# Patient Record
Sex: FEMALE | Race: WHITE
[De-identification: names, ages, dates, MRNs, and addresses within clinical notes are randomized per-mention and may not be internally consistent; named-entity substitution may affect disease eponyms.]

---

## 2021-04-14 ENCOUNTER — HOSPITAL ENCOUNTER (INPATIENT)
Dept: HOSPITAL 47 - EC | Age: 66
LOS: 2 days | Discharge: HOME | DRG: 287 | End: 2021-04-16
Attending: HOSPITALIST | Admitting: HOSPITALIST
Payer: MEDICARE

## 2021-04-14 DIAGNOSIS — R07.89: Primary | ICD-10-CM

## 2021-04-14 DIAGNOSIS — I10: ICD-10-CM

## 2021-04-14 DIAGNOSIS — Z88.2: ICD-10-CM

## 2021-04-14 DIAGNOSIS — Z90.710: ICD-10-CM

## 2021-04-14 DIAGNOSIS — Z88.1: ICD-10-CM

## 2021-04-14 DIAGNOSIS — Z98.890: ICD-10-CM

## 2021-04-14 DIAGNOSIS — Z98.42: ICD-10-CM

## 2021-04-14 DIAGNOSIS — Z90.49: ICD-10-CM

## 2021-04-14 DIAGNOSIS — Z79.82: ICD-10-CM

## 2021-04-14 DIAGNOSIS — Z86.73: ICD-10-CM

## 2021-04-14 DIAGNOSIS — Z88.5: ICD-10-CM

## 2021-04-14 DIAGNOSIS — I44.7: ICD-10-CM

## 2021-04-14 DIAGNOSIS — Z88.0: ICD-10-CM

## 2021-04-14 DIAGNOSIS — E78.5: ICD-10-CM

## 2021-04-14 DIAGNOSIS — J06.9: ICD-10-CM

## 2021-04-14 DIAGNOSIS — Z98.41: ICD-10-CM

## 2021-04-14 PROCEDURE — 93458 L HRT ARTERY/VENTRICLE ANGIO: CPT

## 2021-04-14 PROCEDURE — 93005 ELECTROCARDIOGRAM TRACING: CPT

## 2021-04-14 PROCEDURE — 85025 COMPLETE CBC W/AUTO DIFF WBC: CPT

## 2021-04-14 PROCEDURE — 80048 BASIC METABOLIC PNL TOTAL CA: CPT

## 2021-04-14 PROCEDURE — 99285 EMERGENCY DEPT VISIT HI MDM: CPT

## 2021-04-14 PROCEDURE — 36415 COLL VENOUS BLD VENIPUNCTURE: CPT

## 2021-04-14 PROCEDURE — 84484 ASSAY OF TROPONIN QUANT: CPT

## 2021-04-14 PROCEDURE — 94760 N-INVAS EAR/PLS OXIMETRY 1: CPT

## 2021-04-14 PROCEDURE — 80061 LIPID PANEL: CPT

## 2021-04-14 NOTE — ED
General Adult HPI





- General


Stated complaint: Chest Pain


Time Seen by Provider: 04/14/21 18:10


Source: patient, RN notes reviewed, old records reviewed





- History of Present Illness


Initial comments: 





This is a 65-year-old female that comes to us from [facility].  Patient 

was admitted as a 23 hour observation and patient had 2 negative troponins and a

left bundle branch block which was not seen her previous EKG per patient came in

for chest pain shortness of breath and saw cardiology and they did a stress test

and found that the patient had some areas of potential ischemia and so the 

patient was transferred and accepted to [facility] by Dr. Bear.  He is

exhibiting some very mild chest pain at this time.  Patient denies any recent 

fever chills or cough.





- Related Data


                                Home Medications











 Medication  Instructions  Recorded  Confirmed


 


Aspirin EC [Ecotrin Low Dose] 81 mg PO DAILY 04/14/21 04/14/21











                                    Allergies











Allergy/AdvReac Type Severity Reaction Status Date / Time


 


morphine Allergy  Rash/Hives Verified 04/14/21 20:23


 


ofloxacin [From Floxin] Allergy  Anaphylaxis Verified 04/14/21 20:23


 


Penicillins Allergy  Rash/Hives Verified 04/14/21 20:23


 


sulfamethoxazole Allergy  Rash/Hives Verified 04/14/21 20:23





[From Bactrim]     


 


trimethoprim [From Bactrim] Allergy  Rash/Hives Verified 04/14/21 20:23














Review of Systems


ROS Statement: 


Those systems with pertinent positive or pertinent negative responses have been 

documented in the HPI.





ROS Other: All systems not noted in ROS Statement are negative.





General Exam





- General Exam Comments


Initial Comments: 





GENERAL:


Patient is well-developed and well-nourished.  Patient is nontoxic and well-

hydrated and is in mild distress.





ENT:


Neck is soft and supple.  No significant lymphadenopathy is noted.  Oropharynx 

is clear.  Moist mucous membranes.  Neck has full range of motion without 

eliciting any pain.





EYES:


The sclera were anicteric and conjunctiva were pink and moist.  Extraocular 

movements were intact and pupils were equal round and reactive to light.  

Eyelids were unremarkable.





PULMONARY:


Unlabored respirations.  Good breath sounds bilaterally.  No audible rales 

rhonchi or wheezing was noted.





CARDIOVASCULAR:


There is a regular rate and rhythm without any murmurs gallops or rubs.  





ABDOMEN:


Soft and nontender with normal bowel sounds.  





SKIN:


Skin is clear with no lesions or rashes and otherwise unremarkable.





NEUROLOGIC:


Patient is alert and oriented x3.  Cranial nerves II through XII are grossly 

intact.  Motor and sensory are also intact.  Normal speech, volume and content. 

Symmetrical smile.  





MUSCULOSKELETAL:


Normal extremities with adequate strength and full range of motion.  No lower 

extremity swelling or edema.  No calf tenderness.





LYMPHATICS:


No significant lymphadenopathy is noted





PSYCHIATRIC:


Normal psychiatric evaluation.  





Course


                                   Vital Signs











  04/14/21 04/14/21





  18:21 20:43


 


Temperature 97.9 F 98.1 F


 


Pulse Rate 65 76


 


Respiratory 16 18





Rate  


 


Blood Pressure 138/80 125/78


 


O2 Sat by Pulse 97 98





Oximetry  














Medical Decision Making





- Medical Decision Making





I spoke with Dr. Moreno's nurse practitioner she agreed to accept the patient 

admitted the patient is Dr. Moreno and I wrote admitting orders I consult 

cardiology.  I continue the heparin drip.





EKG shows a normal sinus rhythm at 63 bpm AL interval is 164 QRS is 140 QT 

interval 484 QTC is 495 EKG shows a left bundle branch block





- Lab Data


                                   Lab Results











  04/14/21 Range/Units





  18:35 


 


Troponin I  <0.012  (0.000-0.034)  ng/mL














Disposition


Clinical Impression: 


 Unstable angina pectoris





Disposition: ADMITTED AS IP TO THIS [facility]


Time of Disposition: 20:12

## 2021-04-15 LAB
ANION GAP SERPL CALC-SCNC: 6 MMOL/L
BASOPHILS # BLD AUTO: 0 K/UL (ref 0–0.2)
BASOPHILS NFR BLD AUTO: 1 %
BUN SERPL-SCNC: 11 MG/DL (ref 7–17)
CALCIUM SPEC-MCNC: 8.8 MG/DL (ref 8.4–10.2)
CHLORIDE SERPL-SCNC: 108 MMOL/L (ref 98–107)
CHOLEST SERPL-MCNC: 163 MG/DL (ref 0–200)
CO2 SERPL-SCNC: 25 MMOL/L (ref 22–30)
EOSINOPHIL # BLD AUTO: 0.3 K/UL (ref 0–0.7)
EOSINOPHIL NFR BLD AUTO: 5 %
ERYTHROCYTE [DISTWIDTH] IN BLOOD BY AUTOMATED COUNT: 4.43 M/UL (ref 3.8–5.4)
ERYTHROCYTE [DISTWIDTH] IN BLOOD: 13.4 % (ref 11.5–15.5)
GLUCOSE SERPL-MCNC: 83 MG/DL (ref 74–99)
HCT VFR BLD AUTO: 39.9 % (ref 34–46)
HDLC SERPL-MCNC: 48 MG/DL (ref 40–60)
HGB BLD-MCNC: 13.7 GM/DL (ref 11.4–16)
LDLC SERPL CALC-MCNC: 93 MG/DL (ref 0–131)
LYMPHOCYTES # SPEC AUTO: 1.2 K/UL (ref 1–4.8)
LYMPHOCYTES NFR SPEC AUTO: 26 %
MCH RBC QN AUTO: 30.9 PG (ref 25–35)
MCHC RBC AUTO-ENTMCNC: 34.3 G/DL (ref 31–37)
MCV RBC AUTO: 90 FL (ref 80–100)
MONOCYTES # BLD AUTO: 0.3 K/UL (ref 0–1)
MONOCYTES NFR BLD AUTO: 6 %
NEUTROPHILS # BLD AUTO: 3 K/UL (ref 1.3–7.7)
NEUTROPHILS NFR BLD AUTO: 62 %
PLATELET # BLD AUTO: 187 K/UL (ref 150–450)
POTASSIUM SERPL-SCNC: 4.2 MMOL/L (ref 3.5–5.1)
SODIUM SERPL-SCNC: 139 MMOL/L (ref 137–145)
TRIGL SERPL-MCNC: 110 MG/DL (ref 0–149)
VLDLC SERPL CALC-MCNC: 22 MG/DL (ref 5–40)
WBC # BLD AUTO: 4.9 K/UL (ref 3.8–10.6)

## 2021-04-15 RX ADMIN — NITROGLYCERIN SCH: 20 OINTMENT TOPICAL at 06:21

## 2021-04-15 RX ADMIN — NITROGLYCERIN SCH INCH: 20 OINTMENT TOPICAL at 00:26

## 2021-04-15 NOTE — P.CRDCN
History of Present Illness


Consult date: 04/15/21


History of present illness: 





HISTORY OF PRESENT ILLNESS:  





This is a 65-year-old female with a past medical history significant for TIA. 

Patient does not follow with a cardiologist. We have been asked to see the 

patient in consultation for chest pain. Patient examined at the bedside.  

Patient was transferred from [facility] yesterday secondary to abnormal 

stress test and new left bundle branch block found on EKG.  The patient states 

she has been having shortness of breath for the last 3 weeks and chest pressure.

 She went to the urgent care and was diagnosed with an upper respiratory 

infection and was given 3 different kinds of medication.  The patient states she

did not have any relief with these medications and she called her primary care 

physician who told her to go to the emergency room.  At the time of examination,

the patient denies any chest pain or pressure.





Echocardiogram completed at [facility] revealed ejection fraction of 40-

45% with global hypokinesis, trace to mild tricuspid regurgitation, and trace to

mild mitral regurgitation





Patient underwent Lexiscan stress test at Hospital which revealed a medium-

sized, moderate intensity redundantly fixed defect involving the mid and apical 

anterior and anteroseptal territories with associated reduced wall motion and 

thickening.  This is concerning for a prior jose-infarct ischemia.  Ejection 

fraction noted to be 56%.  No stress transient ischemic dilation of the left 

ventricle.





EKG reveals sinus mechanism with left bundle branch block


Laboratory data: WBC 4.9.  Hemoglobin 13.7.  Platelet count 187.  Sodium 139.  

Potassium 4.2.  BUN 11.  Creatinine 0.63.  Troponin negative 3


Current home cardiac medications include aspirin 81 mg daily





REVIEW OF SYSTEMS: 


At the time of my exam:


CONSTITUTIONAL: Denies fever or chills.


HEENT: Denies blurred vision, vision changes, or eye pain. Denies hemoptysis 


CARDIOVASCULAR: Denies chest pain. Denies orthopnea. Denies PND. Denies 

palpitations


RESPIRATORY: Denies shortness of breath. 


GASTROINTESTINAL: Denies abdominal pain. Denies nausea or vomiting. 


HEMATOLOGIC: Denies bleeding disorders.


GENITOURINARY:  Denies any blood in urine.


SKIN: Denies pruitis. Denies rash.





PHYSICAL EXAM: 


VITAL SIGNS: Reviewed.


GENERAL: Well-developed in no acute distress. 


HEENT: Head is normocephalic. Pupils are equal, round. Sclerae anicteric. Mucous

membranes of the mouth are moist. Neck supple. No JVD or thyromegaly


LUNGS: Respirations even and unlabored. Lungs essentially clear to auscultation 

bilaterally.


HEART: Regular rate and rhythm.  S1 and S2 heard.


ABDOMEN: Soft. Nondistended. Nontender.


EXTREMITIES: Normal range of motion.  No clubbing or cyanosis.  Peripheral 

pulses intact.  No lower extremity edema


NEUROLOGIC: Awake and alert. Oriented x 3. 





ASSESSMENT: 


Chest pain and shortness of breath x 3 weeks


Abnormal Lexiscan stress test 


History of TIA





PLAN: 


Begin aspirin 81 mg daily and metoprolol succinate 12.5 mg daily


Begin Lipitor 40 mg daily. Obtain lipid panel. 


Patient to undergo cardiac catheterization tomorrow with Dr. Spencer


Further recommendations pending patient's course





Nurse practitioner note has been reviewed by physician. Signing provider agrees 

with the documented findings, assessment, and plan of care. 








Past Medical History


Past Medical History: Hypertension


Additional Past Medical History / Comment(s): TIA X2 last one was 3 years ago


History of Any Multi-Drug Resistant Organisms: None Reported


Past Surgical History: Appendectomy,  Section, Cholecystectomy, 

Hysterectomy


Additional Past Surgical History / Comment(s): cataract bilateral eyes


Past Anesthesia/Blood Transfusion Reactions: No Reported Reaction


Past Psychological History: No Psychological Hx Reported


Smoking Status: Never smoker


Past Alcohol Use History: None Reported


Past Drug Use History: None Reported





Medications and Allergies


                                Home Medications











 Medication  Instructions  Recorded  Confirmed  Type


 


Aspirin EC [Ecotrin Low Dose] 81 mg PO DAILY 21 History








                                    Allergies











Allergy/AdvReac Type Severity Reaction Status Date / Time


 


morphine Allergy  Rash/Hives Verified 21 20:23


 


ofloxacin [From Floxin] Allergy  Anaphylaxis Verified 21 20:23


 


Penicillins Allergy  Rash/Hives Verified 21 20:23


 


sulfamethoxazole Allergy  Rash/Hives Verified 21 20:23





[From Bactrim]     


 


trimethoprim [From Bactrim] Allergy  Rash/Hives Verified 21 20:23














Physical Exam


Vitals: 


                                   Vital Signs











  Temp Pulse Pulse Resp BP BP Pulse Ox


 


 04/15/21 07:23        94 L


 


 04/15/21 07:15  98.6 F   74    113/69  96


 


 04/15/21 02:42  97.7 F   60  14   118/77  99


 


 04/15/21 00:19    68    


 


 21 23:00    68  16   


 


 21 22:08  97.8 F   68  16   124/79  99


 


 21 20:43  98.1 F  76   18  125/78   98


 


 21 18:21  97.9 F  65   16  138/80   97








                                Intake and Output











 04/14/21 04/15/21 04/15/21





 22:59 06:59 14:59


 


Other:   


 


  # Voids  3 


 


  Weight 93.894 kg  














Results





                                 04/15/21 06:57





                                 04/15/21 06:57


                                 Cardiac Enzymes











  04/14/21 04/14/21 04/15/21 Range/Units





  18:35 21:17 01:06 


 


Troponin I  <0.012  <0.012  <0.012  (0.000-0.034)  ng/mL








                                     Lipids











  04/15/21 Range/Units





  07:01 


 


Triglycerides  110.0  (0.0-149.0)  mg/dL


 


Cholesterol  163  (0-200)  mg/dL


 


HDL Cholesterol  48.0  (40.0-60.0)  mg/dL


 


Cholesterol/HDL Ratio  3.40  








                                       CBC











  04/15/21 Range/Units





  06:57 


 


WBC  4.9  (3.8-10.6)  k/uL


 


RBC  4.43  (3.80-5.40)  m/uL


 


Hgb  13.7  (11.4-16.0)  gm/dL


 


Hct  39.9  (34.0-46.0)  %


 


Plt Count  187  (150-450)  k/uL








                          Comprehensive Metabolic Panel











  04/15/21 Range/Units





  06:57 


 


Sodium  139  (137-145)  mmol/L


 


Potassium  4.2  (3.5-5.1)  mmol/L


 


Chloride  108 H  ()  mmol/L


 


Carbon Dioxide  25  (22-30)  mmol/L


 


BUN  11  (7-17)  mg/dL


 


Creatinine  0.63  (0.52-1.04)  mg/dL


 


Glucose  83  (74-99)  mg/dL


 


Calcium  8.8  (8.4-10.2)  mg/dL








                               Current Medications











Generic Name Dose Route Start Last Admin





  Trade Name Freq  PRN Reason Stop Dose Admin


 


Alprazolam  0.25 mg  04/15/21 10:26 





  Alprazolam 0.25 Mg Tab  PO  





  Q6HR PRN  





  Mild Anxiety  


 


Alprazolam  0.5 mg  04/15/21 10:26 





  Alprazolam 0.5 Mg Tab  PO  





  Q6HR PRN  





  Moderate Anxiety  


 


Aspirin  81 mg  21 09:00 





  Aspirin 81 Mg  PO  





  DAILY JACQUELYN  


 


Aspirin  325 mg  21 07:00 





  Aspirin 325 Mg Tab  PO  21 07:01 





  ONCE ONE  


 


Atorvastatin Calcium  80 mg  21 07:00 





  Atorvastatin 80 Mg Tab  PO  21 07:01 





  ONCE ONE  


 


Atorvastatin Calcium  40 mg  04/15/21 21:00 





  Atorvastatin 40 Mg Tab  PO  





  HS JACQUELYN  


 


Sodium Chloride 1,000 ml/ IV  1,000 mls @ 93.894 mls/hr  04/15/21 23:00 





  Solution  IV  21 09:39 





  .[id number] ONE  





  1 ML/KG/HR  


 


Heparin Sodium (Porcine) 10,  1,001 mls @ 999 mls/hr  21 07:00 





  000 unit/ Sodium Chloride  IRRIGATION  21 23:00 





  ONCE PRN  





  INTRA-OP  


 


Heparin Sodium (Porcine) 2,500  250.5 mls @ 250 mls/hr  21 07:00 





  unit/ Sodium Chloride  IRRIGATION  21 23:00 





  ONCE PRN  





  INTRA-OP  


 


Nitroglycerin  0.4 mg  21 20:13 





  Nitroglycerin Sl Tabs 0.4 Mg Tab  SUBLINGUAL  





  Q5M PRN  





  Chest Pain  








                                Intake and Output











 04/14/21 04/15/21 04/15/21





 22:59 06:59 14:59


 


Other:   


 


  # Voids  3 


 


  Weight 93.894 kg  








                                        





                                 04/15/21 06:57 





                                 04/15/21 06:57

## 2021-04-15 NOTE — P.HPIM
History of Present Illness


65-year-old the female was seen at outside hospital with complaints of chest 

pressure-like sensation typical chest pain pressure-like sensation nonexertional

on end up going on for about 4 days along with diaphoresis worsens with exertion

and she does have shortness of breath because of shortness of breath patient 

can't even use bathroom.  Patient the had a stress test at a different facility 

which came back positive because of which the patient was transferred here 

patient had a new left bundle branch block on the EKG as well.  Patient chest 

pressure which was mild has been going on for about 3 weeks.  Echocardiac him 

that was done at [facility] showed EF of 4 and 40-45% with global 

hypokinesis.  Patient's stress test is positive involving the mid apical 

anterior and apical anteroseptal territories with inducible ischemia.








Review of Systems








REVIEW OF SYSTEMS: 


CONSTITUTIONAL: No fever, no malaise, no fatigue. 


HEENT: No recent visual problems or hearing problems. Denied any sore throat. 


CARDIOVASCULAR: No orthopnea, PND, no palpitations, no syncope. 


PULMONARY: no cough, no hemoptysis. 


GASTROINTESTINAL: No diarrhea, no nausea, no vomiting, no abdominal pain. 


NEUROLOGICAL: No headaches, no weakness, no numbness. 


HEMATOLOGICAL: Denies any bleeding or petechiae. 


GENITOURINARY: Denies any burning micturition, frequency, or urgency. 


MUSCULOSKELETAL/RHEUMATOLOGICAL: Denies any joint pain, swelling, or any muscle 

pain. 


ENDOCRINE: Denies any polyuria or polydipsia. 





The rest of the 14-point review of systems is negative.











Past Medical History


Past Medical History: Hypertension


Additional Past Medical History / Comment(s): TIA X2 last one was 3 years ago


History of Any Multi-Drug Resistant Organisms: None Reported


Past Surgical History: Appendectomy,  Section, Cholecystectomy, 

Hysterectomy


Additional Past Surgical History / Comment(s): cataract bilateral eyes


Past Anesthesia/Blood Transfusion Reactions: No Reported Reaction


Past Psychological History: No Psychological Hx Reported


Smoking Status: Never smoker


Past Alcohol Use History: None Reported


Past Drug Use History: None Reported





Medications and Allergies


                                Home Medications











 Medication  Instructions  Recorded  Confirmed  Type


 


Aspirin EC [Ecotrin Low Dose] 81 mg PO DAILY 21 History








                                    Allergies











Allergy/AdvReac Type Severity Reaction Status Date / Time


 


morphine Allergy  Rash/Hives Verified 21 20:23


 


ofloxacin [From Floxin] Allergy  Anaphylaxis Verified 21 20:23


 


Penicillins Allergy  Rash/Hives Verified 21 20:23


 


sulfamethoxazole Allergy  Rash/Hives Verified 21 20:23





[From Bactrim]     


 


trimethoprim [From Bactrim] Allergy  Rash/Hives Verified 21 20:23














Physical Exam


Vitals: 


                                   Vital Signs











  Temp Pulse Pulse Resp BP BP Pulse Ox


 


 04/15/21 07:23        94 L


 


 04/15/21 07:15  98.6 F   74    113/69  96


 


 04/15/21 02:42  97.7 F   60  14   118/77  99


 


 04/15/21 00:19    68    


 


 21 23:00    68  16   


 


 21 22:08  97.8 F   68  16   124/79  99


 


 21 20:43  98.1 F  76   18  125/78   98


 


 21 18:21  97.9 F  65   16  138/80   97








                                Intake and Output











 04/14/21 04/15/21 04/15/21





 22:59 06:59 14:59


 


Other:   


 


  # Voids  3 


 


  Weight 93.894 kg  

















PHYSICAL EXAMINATION: 





GENERAL: The patient is alert and oriented x3, not in any acute distress. Well 

developed, well nourished. 


HEENT: Pupils are round and equally reacting to light. EOMI. No scleral icterus.

No conjunctival pallor. Normocephalic, atraumatic. No pharyngeal erythema. No 

thyromegaly. 


CARDIOVASCULAR: S1 and S2 present. No murmurs, rubs, or gallops. 


PULMONARY: Chest is clear to auscultation, no wheezing or crackles. 


ABDOMEN: Soft, nontender, nondistended, normoactive bowel sounds. No palpable 

organomegaly. 


MUSCULOSKELETAL: No joint swelling or deformity.


EXTREMITIES: No cyanosis, clubbing, or pedal edema. 


NEUROLOGICAL: Gross neurological examination did not reveal any focal deficits. 


SKIN: No rashes. 

















Results


CBC & Chem 7: 


                                 04/15/21 06:57





                                 04/15/21 06:57


Labs: 


                  Abnormal Lab Results - Last 24 Hours (Table)











  04/15/21 Range/Units





  06:57 


 


Chloride  108 H  ()  mmol/L














Thrombosis Risk Factor Assmnt





- Choose All That Apply


Any of the Below Risk Factors Present?: Yes


Each Factor Represents 1 point: Age 41-60 years, Swollen legs (current)


Other Risk Factors: Yes


Each Risk Factor Represents 2 Points: Age 61-74 years


Other congenital or acquired thrombophilia - If yes, enter type in comment: No


Thrombosis Risk Factor Assessment Total Risk Factor Score: 4


Thrombosis Risk Factor Assessment Level: Moderate Risk





Assessment and Plan


Plan: 





UnStable angina: Patient had abnormal stress test will undergo cardiac 

catheterization tomorrow.


History of strokes CVA in the past.


-Hyperlipidemia

## 2021-04-16 VITALS
SYSTOLIC BLOOD PRESSURE: 119 MMHG | TEMPERATURE: 98.2 F | HEART RATE: 62 BPM | RESPIRATION RATE: 18 BRPM | DIASTOLIC BLOOD PRESSURE: 80 MMHG

## 2021-04-16 LAB
ANION GAP SERPL CALC-SCNC: 8.1 MMOL/L (ref 4–12)
BASOPHILS # BLD AUTO: 0.03 X 10*3/UL (ref 0–0.1)
BASOPHILS NFR BLD AUTO: 0.6 %
BUN SERPL-SCNC: 11 MG/DL (ref 9–27)
BUN/CREAT SERPL: 18.33 RATIO (ref 12–20)
CALCIUM SPEC-MCNC: 8.5 MG/DL (ref 8.7–10.3)
CHLORIDE SERPL-SCNC: 108 MMOL/L (ref 96–109)
CO2 SERPL-SCNC: 25.9 MMOL/L (ref 21.6–31.8)
EOSINOPHIL # BLD AUTO: 0.22 X 10*3/UL (ref 0.04–0.35)
EOSINOPHIL NFR BLD AUTO: 4.6 %
ERYTHROCYTE [DISTWIDTH] IN BLOOD BY AUTOMATED COUNT: 4.32 X 10*6/UL (ref 4.1–5.2)
ERYTHROCYTE [DISTWIDTH] IN BLOOD: 13.2 % (ref 11.5–14.5)
GLUCOSE SERPL-MCNC: 85 MG/DL (ref 70–110)
HCT VFR BLD AUTO: 40 % (ref 37.2–46.3)
HGB BLD-MCNC: 12.8 G/DL (ref 12–15)
LYMPHOCYTES # SPEC AUTO: 1.2 X 10*3/UL (ref 0.9–5)
LYMPHOCYTES NFR SPEC AUTO: 25.2 %
MCH RBC QN AUTO: 29.6 PG (ref 27–32)
MCHC RBC AUTO-ENTMCNC: 32 G/DL (ref 32–37)
MCV RBC AUTO: 92.6 FL (ref 80–97)
MONOCYTES # BLD AUTO: 0.5 X 10*3/UL (ref 0.2–1)
MONOCYTES NFR BLD AUTO: 10.5 %
NEUTROPHILS # BLD AUTO: 2.8 X 10*3/UL (ref 1.8–7.7)
NEUTROPHILS NFR BLD AUTO: 58.9 %
PLATELET # BLD AUTO: 170 X 10*3/UL (ref 140–440)
POTASSIUM SERPL-SCNC: 3.8 MMOL/L (ref 3.5–5.5)
SODIUM SERPL-SCNC: 142 MMOL/L (ref 135–145)
WBC # BLD AUTO: 4.76 X 10*3/UL (ref 4.5–10)

## 2021-04-16 PROCEDURE — 4A023N7 MEASUREMENT OF CARDIAC SAMPLING AND PRESSURE, LEFT HEART, PERCUTANEOUS APPROACH: ICD-10-PCS

## 2021-04-16 PROCEDURE — B2111ZZ FLUOROSCOPY OF MULTIPLE CORONARY ARTERIES USING LOW OSMOLAR CONTRAST: ICD-10-PCS

## 2021-04-16 NOTE — CC
CARDIAC CATHETERIZATION REPORT



INDICATION:

This is a 65-year-old lady that presented to [facility] with symptoms of chest

discomfort and shortness of breath and had a stress test that showed myocardial

infarction involving the anterior wall, preserved LV function without any ischemia.

Due to unexplained symptoms and the abnormal stress test, I advised the patient to

undergo cardiac catheterization.  She has been explained of risks, benefits and

alternatives understood and accepted.



PROCEDURE NOTE:

After obtaining informed consent, left heart catheterization, coronary angiogram are

performed via the right femoral artery using standard [facility] catheters.  Patient

tolerated the procedure well without any obvious immediate complications.  A femoral

angiogram was performed _____ hemostasis.  Patient received moderate conscious

sedation.  Total sedation time was 15 minutes.



FINDINGS:



HEMODYNAMICS:

Left ventricular end-diastolic pressure is 12-14 mm.  There is no significant gradient

across the aortic valve.



LEFT VENTRICULOGRAM:

Left ventriculogram is not performed.



ANGIOGRAPHIC DATA:

The left main coronary artery: Left main coronary artery is a short vessel and is free

of stenosis.  Divides into left anterior descending coronary artery and circumflex

coronary artery.

Left anterior descending coronary artery: LAD and its branches are free of significant

stenosis.

CIRCUMFLEX CORONARY ARTERY: Circumflex coronary artery is a codominant vessel and is

free of significant disease.

RIGHT CORONARY ARTERY: Right coronary artery is codominant system and is free of

significant disease.



CONCLUSIONS:

1. Normal coronary arteries.

2. Normal left ventricular end-diastolic pressure.



PLAN:

Patient's shortness of breath and chest pain is noncardiac in origin.  She will need

workup in the outpatient setting.





MMODL / IJN: [id number] / Job#: [id number]

## 2021-04-25 NOTE — P.DS
Providers


Date of admission: 


04/16/21 12:00





Expected date of discharge: 04/16/21


Attending physician: 


Jean Moreno





Consults: 





                                        





04/14/21 20:13


Consult Physician Urgent 


   Consulting Provider: [facility]


   Consult Reason/Comments: Unstable angina


   Do you want consulting provider notified?: Yes











Primary care physician: 


Linda Gonzalez MD





Hospital Course: 


 65-year-old female with a past medical history significant for TIA. Patient 

does not follow with a cardiologist. We have been asked to see the patient in 

consultation for chest pain. Patient examined at the bedside.  Patient was 

transferred from [facility] yesterday secondary to abnormal stress test 

and new left bundle branch block found on EKG.  The patient states she has been 

having shortness of breath for the last 3 weeks and chest pressure.  She went to

the urgent care and was diagnosed with an upper respiratory infection and was 

given 3 different kinds of medication.  The patient states she did not have any 

relief with these medications and she called her primary care physician who told

her to go to the emergency room.  At the time of examination, the patient denies

any chest pain or pressure.





Echocardiogram completed at [facility] revealed ejection fraction of 40-

45% with global hypokinesis, trace to mild tricuspid regurgitation, and trace to

mild mitral regurgitation





Patient underwent Lexiscan stress test at Hospital which revealed a medium-

sized, moderate intensity redundantly fixed defect involving the mid and apical 

anterior and anteroseptal territories with associated reduced wall motion and 

thickening.  This is concerning for a prior jose-infarct ischemia.  Ejection 

fraction noted to be 56%.  No stress transient ischemic dilation of the left 

ventricle.





EKG reveals sinus mechanism with left bundle branch block


Laboratory data: WBC 4.9.  Hemoglobin 13.7.  Platelet count 187.  Sodium 139.  

Potassium 4.2.  BUN 11.  Creatinine 0.63.  Troponin negative 3


Current home cardiac medications include aspirin 81 mg daily


Chest pain and shortness of breath x 3 weeks


Abnormal Lexiscan stress test 


History of TIA





PLAN: 


Begin aspirin 81 mg daily and metoprolol succinate 12.5 mg daily


Begin Lipitor 40 mg daily. Obtain lipid panel. 


Patient to undergo cardiac catheterization which was unremarkable











Plan - Discharge Summary


Discharge Rx Participant: Yes


New Discharge Prescriptions: 


New


   Aspirin 81 mg PO DAILY  chew


   Metoprolol Succinate (ER) [Toprol XL] 12.5 mg PO DAILY #30 tab.er.24h





Continue


   Aspirin EC [Ecotrin Low Dose] 81 mg PO DAILY


Discharge Medication List





Aspirin EC [Ecotrin Low Dose] 81 mg PO DAILY 04/14/21 [History]


Aspirin 81 mg PO DAILY  chew 04/16/21 [Rx]


Metoprolol Succinate (ER) [Toprol XL] 12.5 mg PO DAILY #30 tab.er.24h 04/16/21 

[Rx]








Follow up Appointment(s)/Referral(s): 


Nonstaff,Physician [REFERRING] - 1-2 days


Carlos Spencer MD [STAFF PHYSICIAN] - 2 Weeks


Patient Instructions/Handouts:  Heart Catheterization (DC), Angio-Seal (DC)


Discharge Disposition: HOME SELF-CARE

## 2021-05-06 ENCOUNTER — HOSPITAL ENCOUNTER (OUTPATIENT)
Dept: HOSPITAL 47 - RADUSWWP | Age: 66
Discharge: HOME | End: 2021-05-06
Attending: INTERNAL MEDICINE
Payer: MEDICARE

## 2021-05-06 DIAGNOSIS — R07.2: Primary | ICD-10-CM

## 2021-05-06 PROCEDURE — 93975 VASCULAR STUDY: CPT

## 2021-05-06 NOTE — US
EXAMINATION TYPE: US lower ext pseudo artery RT

 

DATE OF EXAM: 5/6/2021

 

COMPARISON: NONE

 

CLINICAL HISTORY: Z98.890 postprocedural state R07.2 Precordial pain. Pt states right groin pain sp h
eart cath right groin approach 2-3 weeks ago/ pt states no known history of DVT

 

EXAM PERFORMED: Grayscale and color Doppler duplex imaging performed of the groin, post cardiac nicolás
ter to assess for pseudoaneurysm.  

 

SIDE PERFORMED: Right

 

Color and Waveform Doppler performed to assess for the presence of pseudoaneurysm;

 

Is there ultrasound evidence of a pseudoaneurysm:  No

Is there evidence of AV shunting: No

Is there a fluid collection present: No

 

**Probable puncture site visualized within right groin, no abnormality visualized at this time, pleas
e note CFV, GSV, and prox femoral vein show no evidence of DVT**

 

 

 

IMPRESSION: No suspicious focal fluid collection or pseudoaneurysm.